# Patient Record
Sex: MALE | Race: AMERICAN INDIAN OR ALASKA NATIVE
[De-identification: names, ages, dates, MRNs, and addresses within clinical notes are randomized per-mention and may not be internally consistent; named-entity substitution may affect disease eponyms.]

---

## 2021-03-17 ENCOUNTER — HOSPITAL ENCOUNTER (EMERGENCY)
Dept: HOSPITAL 5 - ED | Age: 46
Discharge: HOME | End: 2021-03-17
Payer: COMMERCIAL
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
## 2021-03-17 DIAGNOSIS — Z90.49: ICD-10-CM

## 2021-03-17 DIAGNOSIS — S56.912A: Primary | ICD-10-CM
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
## 2021-03-17 DIAGNOSIS — Z79.899: ICD-10-CM
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
## 2021-03-17 PROCEDURE — 99282 EMERGENCY DEPT VISIT SF MDM: CPT

## 2021-03-17 NOTE — EMERGENCY DEPARTMENT REPORT
Upper Extremity





- HPI


Chief Complaint: Extremity Injury, Upper


Stated Complaint: LT ARM PAIN W/KNOT


Time Seen by Provider: 03/17/21 02:42


Upper Extremity: Left Forearm


Occurred When: 2 Days


Mechanism: Other (overuse )


Severity: moderate


Symptoms: Yes Pain with Movement, Yes Swelling, No Deformity, No Limited Range 

of Movement, No Numbness, No Weakness, No Bruising/Ecchymosis, No Laceration or 

Abrasion


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 

forearm pain and aching swelling x3 days.  Pain described as 5/10 soreness in 

achy exacerbated by movement and lifting.  There is no numbness tingling there 

is no abrasion laceration or deformity.  Range of motion is intact





ED Review of Systems


ROS: 


Stated complaint: LT ARM PAIN W/KNOT


Other details as noted in HPI





Constitutional: denies: chills, fever


Eyes: denies: eye pain, eye discharge, vision change


ENT: denies: ear pain, throat pain


Respiratory: denies: cough, shortness of breath, wheezing


Cardiovascular: denies: chest pain, palpitations


Endocrine: no symptoms reported


Gastrointestinal: denies: abdominal pain, nausea, diarrhea


Genitourinary: as per HPI


Musculoskeletal: other (forearm pain)


Skin: denies: rash, lesions


Neurological: denies: headache, weakness, paresthesias


Psychiatric: denies: anxiety, depression


Hematological/Lymphatic: denies: easy bleeding, easy bruising





ED Past Medical Hx





- Past Medical History


Previous Medical History?: No





- Surgical History


Past Surgical History?: Yes


Hx Cholecystectomy: Yes





- Social History


Smoking Status: Never Smoker


Substance Use Type: None





- Medications


Home Medications: 


                                Home Medications











 Medication  Instructions  Recorded  Confirmed  Last Taken  Type


 


HYDROcodone/APAP  [Norco 1 each PO Q6HR PRN #24 tablet 12/15/13  Unknown 

Rx





10/325]     


 


Promethazine [Phenergan] 25 mg PO Q6H PRN #30 tablet 12/15/13  Unknown Rx


 


Acetaminophen/Codeine [Tylenol #3] 1 tab PO Q6H PRN #15 tab 06/11/15  Unknown Rx


 


Benzonatate [Tessalon Perles] 100 mg PO Q8HR #30 capsule 06/11/15  Unknown Rx


 


Fluticasone [Flonase] 1 spray NS QDAY #1 bottle 06/11/15  Unknown Rx


 


Acetaminophen/Codeine [Tylenol #3] 1 tab PO Q6H PRN #20 tab 01/01/16  Unknown Rx


 


Clindamycin [Clindamycin CAP] 300 mg PO Q6HR #80 capsule 01/01/16  Unknown Rx


 


Ibuprofen [Motrin] 600 mg PO Q8H PRN #40 tablet 01/01/16  Unknown Rx


 


Cyclobenzaprine [Flexeril] 10 mg PO TID PRN #30 tablet 03/17/21  Unknown Rx


 


Menthol/Camphor [Tiger Balm 1 applicatio TP Q6H PRN #1 tube 03/17/21  Unknown Rx





Ointment]     


 


Naproxen 500 mg PO BID PRN #30 tablet 03/17/21  Unknown Rx


 


predniSONE [Deltasone] 40 mg PO QDAY 5 Days #10 tab 03/17/21  Unknown Rx














Upper Extremity Exam





- Exam


General: 


Vital signs noted. No distress. Alert and acting appropriately.





Head and Torso: No HEENT Abnormality, No Neck Tenderness, No Chest/Lungs 

Abnormality, No Abdominal Tenderness, No Back Tenderness


Shoulder Exam: Yes Normal Range of Motion in Shoulder, No Shoulder Tenderness, 

No Clavicle Tenderness, No Shoulder Deformity, No AC Joint Tenderness


Arm Exam: No Arm/Humerus Tenderness, No Arm Deformity


Elbow: No Elbow Tenderness, No Normal Range of Motion in Elbow, No Elbow 

Deformity


Forearm: Yes Forearm Tenderness, Yes Pain with Pronation, Yes Pain with 

Supination, No Forearm Deformity


Wrist: Yes Normal ROM in Wrist, No Wrist Tenderness, No Wrist Deformity, No 

Snuffbox Tenderness, No Pain with Axial Thumb Compression


Hand: Yes Digit Tenderness, Yes Normal ROM in Digit(s), No Hand Tenderness, No 

Hand Deformity, No Digit(s) Deformity, No Tendon Dysfunction


CMS Exam: Yes Normal Distal Pulses, Yes Normal Capillary Refill, Yes Normal 

Distal Sensation, No Broken Skin





ED Medical Decision Making





- Medical Decision Making


This is a muscle strain secondary to overuse.  Distal pulses intact  intact

 no pain with simulated axial thumb loading.  No xiphoid process tenderness CRT 

is less than 3 seconds bilateral.  Plan wrist splint, NSAIDs, steroids wrist 

exercises, moist heat therapy follow-up with PCP in 2 to 3 days.  Patient 

verbalized agreement and understanding with discharge plan.  Patient DC'd home 

in stable condition at this time.


Critical care attestation.: 


If time is entered above; I have spent that time in minutes in the direct care 

of this critically ill patient, excluding procedure time.








ED Disposition


Clinical Impression: 


Strain of forearm, left


Qualifiers:


 Encounter type: initial encounter Qualified Code(s): S56.912A - Strain of 

unspecified muscles, fascia and tendons at forearm level, left arm, initial 

encounter





Disposition: DC-01 TO HOME OR SELFCARE


Is pt being admited?: No


Does the pt Need Aspirin: No


Condition: Stable


Instructions:  Muscle Strain, Easy-to-Read


Prescriptions: 


predniSONE [Deltasone] 40 mg PO QDAY 5 Days #10 tab


Cyclobenzaprine [Flexeril] 10 mg PO TID PRN #30 tablet


 PRN Reason: Muscle Spasm


Naproxen 500 mg PO BID PRN #30 tablet


 PRN Reason: pain


Menthol/Camphor [Tiger Balm Ointment] 1 applicatio TP Q6H PRN #1 tube


 PRN Reason: pain


Referrals: 


DR IRINA [Other] - 3-5 Days


Forms:  Work/School Release Form(ED)


Time of Disposition: 02:50

## 2021-12-01 ENCOUNTER — HOSPITAL ENCOUNTER (EMERGENCY)
Dept: HOSPITAL 5 - ED | Age: 46
LOS: 1 days | Discharge: ANOTHER HOSPITAL | End: 2021-12-02
Payer: COMMERCIAL

## 2021-12-01 DIAGNOSIS — G83.9: ICD-10-CM
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
## 2021-12-01 DIAGNOSIS — Z90.49: ICD-10-CM

## 2021-12-01 DIAGNOSIS — S24.119A: Primary | ICD-10-CM

## 2021-12-01 DIAGNOSIS — Z79.899: ICD-10-CM

## 2021-12-01 LAB
ALBUMIN SERPL-MCNC: 5 G/DL (ref 3.9–5)
ALT SERPL-CCNC: 50 UNITS/L (ref 7–56)
BASOPHILS # (AUTO): 0 K/MM3 (ref 0–0.1)
BASOPHILS NFR BLD AUTO: 0.3 % (ref 0–1.8)
BUN SERPL-MCNC: 12 MG/DL (ref 9–20)
BUN/CREAT SERPL: 17 %
CALCIUM SERPL-MCNC: 10.2 MG/DL (ref 8.4–10.2)
EOSINOPHIL # BLD AUTO: 0.1 K/MM3 (ref 0–0.4)
EOSINOPHIL NFR BLD AUTO: 2.1 % (ref 0–4.3)
HCT VFR BLD CALC: 47 % (ref 35.5–45.6)
HEMOLYSIS INDEX: 17
HGB BLD-MCNC: 15.5 GM/DL (ref 11.8–15.2)
LYMPHOCYTES # BLD AUTO: 0.9 K/MM3 (ref 1.2–5.4)
LYMPHOCYTES NFR BLD AUTO: 16.2 % (ref 13.4–35)
MCHC RBC AUTO-ENTMCNC: 33 % (ref 32–34)
MCV RBC AUTO: 84 FL (ref 84–94)
MONOCYTES # (AUTO): 0.6 K/MM3 (ref 0–0.8)
MONOCYTES % (AUTO): 10.1 % (ref 0–7.3)
PLATELET # BLD: 252 K/MM3 (ref 140–440)
RBC # BLD AUTO: 5.63 M/MM3 (ref 3.65–5.03)

## 2021-12-01 PROCEDURE — 72131 CT LUMBAR SPINE W/O DYE: CPT

## 2021-12-01 PROCEDURE — 85025 COMPLETE CBC W/AUTO DIFF WBC: CPT

## 2021-12-01 PROCEDURE — 80053 COMPREHEN METABOLIC PANEL: CPT

## 2021-12-01 PROCEDURE — 36415 COLL VENOUS BLD VENIPUNCTURE: CPT

## 2021-12-01 PROCEDURE — 99285 EMERGENCY DEPT VISIT HI MDM: CPT

## 2021-12-01 PROCEDURE — 96374 THER/PROPH/DIAG INJ IV PUSH: CPT

## 2021-12-01 PROCEDURE — 96375 TX/PRO/DX INJ NEW DRUG ADDON: CPT

## 2021-12-01 PROCEDURE — 96376 TX/PRO/DX INJ SAME DRUG ADON: CPT

## 2021-12-01 PROCEDURE — 72128 CT CHEST SPINE W/O DYE: CPT

## 2021-12-01 NOTE — CAT SCAN REPORT
. CT LUMBAR SPINE WO CON



INDICATION / CLINICAL INFORMATION:

46 years Male; saddle numbness.



TECHNIQUE:

Axial CT images of the lumbar spine were obtained.  Sagittal and coronal reformatted images were prod
uced. All CT scans at this location are performed using CT dose reduction for ALARA by means of autom
ated exposure control.



COMPARISON: 

None available.



FINDINGS:



POST-SURGICAL CHANGES: None.



ALIGNMENT: No significant abnormality.

VERTEBRAE: No signs of fracture. Vertebral bodies are grossly normal in height throughout.  No signif
icant facet joint disease or osseous foraminal narrowing appreciated.



INTERVERTEBRAL DISCS: No significant abnormality.



PARASPINAL SOFT TISSUES: No significant abnormality.



ADDITIONAL FINDINGS: Prior cholecystectomy on the right.



IMPRESSION:

1. No single, dominant cause for patient's symptomatology appreciated.



Signer Name: Walt Posada MD, III 

Signed: 12/1/2021 10:50 PM

Workstation Name: [email or web address] [id number]

## 2021-12-01 NOTE — CAT SCAN REPORT
CT thoracic spine wo con



INDICATION / CLINICAL INFORMATION:

46 years Male; saddle numbness.



TECHNIQUE:

Axial CT images of the thoracic spine were obtained. Sagittal and coronal reformatted images were pro
duced. All CT scans at this location are performed using CT dose reduction for ALARA by means of auto
mated exposure control.



COMPARISON: 

None available.



FINDINGS:



POST-SURGICAL CHANGES: None.



ALIGNMENT: Mild scoliosis seen.



VERTEBRAE: No signs of fracture. Vertebral bodies are grossly normal in height throughout.  



There is heterogeneous appearance of the T8 vertebrae. There is abnormal soft tissue along the rightw
kaye margin of the T8 vertebral body as well. Neoplastic process cannot be excluded. Dedicated pre and
 postcontrast MRI of the thoracic spine would be helpful for further evaluation. Remainder of the kosta
tebrae are grossly normal in appearance.  



INTERVERTEBRAL DISCS: No significant abnormality. No definitive signs of dominant herniation.



Within the vertebral canal along the right posterolateral margin, there may be an extra medullary les
ion at the T7 and T8 levels. Dedicated pre and postcontrast MRI of the thoracic spine would be helpfu
l for further evaluation.



PARASPINAL SOFT TISSUES: No significant abnormality.



ADDITIONAL FINDINGS: None.



IMPRESSION:

1. Abnormal appearance at T8 as described above. Underlying neoplastic process cannot be excluded. De
dicated MRI of the thoracic spine with and without contrast would be helpful for further evaluation. 
 

2. Extra medullary lesion in the vertebral canal at the T7 and T8 levels may be present as well. Agai
n, MRI would be of benefit.



Signer Name: Walt Posada MD, III 

Signed: 12/1/2021 9:28 PM

Workstation Name: [email or web address]

## 2021-12-02 VITALS — SYSTOLIC BLOOD PRESSURE: 127 MMHG | DIASTOLIC BLOOD PRESSURE: 76 MMHG
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
